# Patient Record
Sex: MALE | Race: WHITE
[De-identification: names, ages, dates, MRNs, and addresses within clinical notes are randomized per-mention and may not be internally consistent; named-entity substitution may affect disease eponyms.]

---

## 2019-09-04 ENCOUNTER — HOSPITAL ENCOUNTER (EMERGENCY)
Dept: HOSPITAL 11 - JP.ED | Age: 34
Discharge: HOME | End: 2019-09-04
Payer: MEDICAID

## 2019-09-04 DIAGNOSIS — F17.210: ICD-10-CM

## 2019-09-04 DIAGNOSIS — Z88.0: ICD-10-CM

## 2019-09-04 DIAGNOSIS — N39.0: Primary | ICD-10-CM

## 2019-09-04 DIAGNOSIS — Z79.899: ICD-10-CM

## 2019-09-04 NOTE — EDM.PDOC
ED HPI GENERAL MEDICAL PROBLEM





- General


Chief Complaint: Flank Pain


Stated Complaint: LOW BACK/FLANK PAIN


Time Seen by Provider: 09/04/19 19:55


Source of Information: Reports: Patient


History Limitations: Reports: No Limitations





- History of Present Illness


INITIAL COMMENTS - FREE TEXT/NARRATIVE: 





33-year-old male with left posterior flank pain intermittent for the past week. 

He's having trouble with certain range of motion, sitting, standing and is 

keeping him awake at night. Intermittent radiation around to the abdomen, no 

dysuria or urinary frequency. No history of renal stones. No fevers or chills, 

nausea or vomiting.


Onset: Sudden


Duration: Day(s): (Started fairly suddenly 7 days ago)


Location: Reports: Back (Left flank)


Worsens with: Reports: Movement (Seems to be worse with movements and certain 

positions)


Associated Symptoms: Reports: No Other Symptoms


  ** Left Flank


Pain Score (Numeric/FACES): 9





- Related Data


 Allergies











Allergy/AdvReac Type Severity Reaction Status Date / Time


 


Penicillins Allergy  Nausea Verified 09/04/19 19:50











Home Meds: 


 Home Meds





Allopurinol [Zyloprim] 200 mg PO DAILY 09/04/19 [History]











Past Medical History





- Past Health History


Medical/Surgical History: Denies Medical/Surgical History


Genitourinary History: Reports: Renal Calculus





- Past Surgical History


Head Surgeries/Procedures: Reports: None


Male  Surgical History: Reports: None


Dermatological Surgical History: Reports: None





Social & Family History





- Family History


Family Medical History: Noncontributory





- Tobacco Use


Smoking Status *Q: Current Every Day Smoker


Years of Tobacco use: 20


Packs/Tins Daily: 1


Second Hand Smoke Exposure: Yes





- Caffeine Use


Caffeine Use: Reports: Coffee, Soda





- Recreational Drug Use


Recreational Drug Use: No





ED ROS GENERAL





- Review of Systems


Review Of Systems: See Below


Constitutional: Denies: Fever, Chills


Respiratory: Denies: Shortness of Breath


Cardiovascular: Denies: Chest Pain


GI/Abdominal: Denies: Abdominal Pain, Nausea, Vomiting


: Reports: Flank Pain


Musculoskeletal: Reports: Back Pain


Skin: Reports: No Symptoms (Left-sided)





ED EXAM,LOWER BACK PAIN/INJURY





- Physical Exam


Exam: See Below


Exam Limited By: No Limitations


General Appearance: Alert, No Apparent Distress (Looks uncomfortable but not 

distressed)


Head: Atraumatic


Respiratory/Chest: No Respiratory Distress, Lungs Clear


Cardiovascular: Regular Rate, Rhythm


Back Exam: CVA Tenderness (L) (He does have some mild CVA tenderness on the 

left side, and paraspinal tenderness to palpation).  No: CVA Tenderness (R)


Neurological: Alert, No Motor/Sensory Deficits, Oriented x 3





Course





- Vital Signs


Last Recorded V/S: 


 Last Vital Signs











Temp  96.9 F   09/04/19 19:47


 


Pulse  81   09/04/19 19:47


 


Resp  16   09/04/19 19:47


 


BP  139/94 H  09/04/19 19:47


 


Pulse Ox  97   09/04/19 19:47














- Orders/Labs/Meds


Orders: 


 Active Orders 24 hr











 Category Date Time Status


 


 CULTURE URINE [RM] Stat Lab  09/04/19 20:50 Received











Labs: 


 Laboratory Tests











  09/04/19 Range/Units





  20:05 


 


Urine Color  Yellow  (YELLOW)  


 


Urine Appearance  Clear  (CLEAR)  


 


Urine pH  5.5  (5.0-8.0)  


 


Ur Specific Gravity  1.005 L  (1.008-1.030)  


 


Urine Protein  Negative  (NEGATIVE)  mg/dL


 


Urine Glucose (UA)  Negative  (NEGATIVE)  mg/dL


 


Urine Ketones  Negative  (NEGATIVE)  mg/dL


 


Urine Occult Blood  Trace-lysed H  (NEGATIVE)  


 


Urine Nitrite  Negative  (NEGATIVE)  


 


Urine Bilirubin  Negative  (NEGATIVE)  


 


Urine Urobilinogen  0.2  (0.2-1.0)  EU/dL


 


Ur Leukocyte Esterase  Small  (NEGATIVE)  


 


Urine RBC  0-5  (0-5)  


 


Urine WBC  10-20 H  (0-5)  


 


Ur Epithelial Cells  Few  


 


Amorphous Sediment  Not seen  


 


Urine Bacteria  Few  


 


Urine Mucus  Not seen  














- Re-Assessments/Exams


Free Text/Narrative Re-Assessment/Exam: 





09/04/19 20:10


A UA was obtained and a CT of the abdomen and pelvis without contrast will be 

ordered.


09/04/19 20:48


UA returned with some WBCs and bacteria. CT also confirmed some bladder 

thickening and slight stranding which could indicate inflammation. Also he only 

has a single pelvic kidney. A urine culture was obtained, and the findings 

discussed with the patient. He'll be started on Macrobid twice daily pending 

the culture, and given some Flexeril and a 10 hydrocodone for pain control. He 

may want to cut back on the ibuprofen since he has a single kidney. Recheck 

with a primary provider in the next 1-2 weeks for a regular physical and 

consider a nephrology consult.





Departure





- Departure


Time of Disposition: 21:01


Disposition: Home, Self-Care 01


Clinical Impression: 


 UTI, Urinary tract infectious disease, Left flank pain








- Discharge Information


Instructions:  Flank Pain, Adult, Easy-to-Read


Referrals: 


PCP,None [Primary Care Provider] - 


Forms:  ED Department Discharge


Care Plan Goals: 


Use muscle relaxers and stronger pain medications as directed if needed. Take 

antibiotic twice daily and we will contact you with culture results if changes 

need to be made. Drink lots of water and try to cut back on ibuprofen intake. 

Consider getting a primary provider for a regular physical and discuss being 

followed by nephrology due to your single kidney. Return sooner if worsening 

such as fever or increased pain.





- My Orders


Last 24 Hours: 


My Active Orders





09/04/19 20:50


CULTURE URINE [RM] Stat 














- Assessment/Plan


Last 24 Hours: 


My Active Orders





09/04/19 20:50


CULTURE URINE [RM] Stat

## 2019-09-04 NOTE — CRLCT
INDICATION:



Left-sided flank pain.



TECHNIQUE:



CT of the abdomen and pelvis performed without oral or IV contrast.



FINDINGS:



Single kidney within the midline and left pelvis is somewhat bulbous. Mild 

to moderate dilatation of the internal collecting system and ureter of this 

pelvic kidney. No renal or ureteral calculi. The urinary bladder is 

somewhat lobulated in its wall is mildly thickened and irregular. Minimal 

stranding about the pelvic kidney and urinary bladder which could be 

related to slight inflammation or edema but is of indeterminate age. 

Remainder negative.



IMPRESSION:



1. No acute disease in abdomen or pelvis.



2. Single pelvic kidney with mild dilatation of the ureter internal 

collecting system of this kidney. No renal ureteral calculi.



3. Mildly thickened irregular bladder wall.



4. Slight stranding about the urinary bladder and the pelvic kidney which 

could be related to slight inflammation or edema of an indeterminate 

etiology.



5. Small nodule left adrenal gland may be an adrenal adenoma.



Please note that all CT scans at this facility use dose modulation, 

iterative reconstruction, and/or weight-based dosing when appropriate to 

reduce radiation dose to as low as reasonably achievable.



Dictated by Silvano Ndiaye MD @ Sep  4 2019  8:38PM



Signed by Dr. Silvano Ndiaye @ Sep  4 2019  8:38PM

## 2021-10-06 ENCOUNTER — HOSPITAL ENCOUNTER (EMERGENCY)
Dept: HOSPITAL 11 - JP.ED | Age: 36
Discharge: HOME | End: 2021-10-06
Payer: MEDICAID

## 2021-10-06 DIAGNOSIS — I11.0: Primary | ICD-10-CM

## 2021-10-06 DIAGNOSIS — Z79.899: ICD-10-CM

## 2021-10-06 DIAGNOSIS — F17.200: ICD-10-CM

## 2021-10-06 DIAGNOSIS — Z88.0: ICD-10-CM

## 2021-10-06 DIAGNOSIS — R00.0: ICD-10-CM

## 2021-10-06 DIAGNOSIS — I50.21: ICD-10-CM

## 2021-10-06 DIAGNOSIS — Z20.822: ICD-10-CM

## 2021-10-06 PROCEDURE — 87635 SARS-COV-2 COVID-19 AMP PRB: CPT

## 2021-10-06 PROCEDURE — 83880 ASSAY OF NATRIURETIC PEPTIDE: CPT

## 2021-10-06 PROCEDURE — 93005 ELECTROCARDIOGRAM TRACING: CPT

## 2021-10-06 PROCEDURE — 84484 ASSAY OF TROPONIN QUANT: CPT

## 2021-10-06 PROCEDURE — 36415 COLL VENOUS BLD VENIPUNCTURE: CPT

## 2021-10-06 PROCEDURE — 80053 COMPREHEN METABOLIC PANEL: CPT

## 2021-10-06 PROCEDURE — 85025 COMPLETE CBC W/AUTO DIFF WBC: CPT

## 2021-10-06 PROCEDURE — 71045 X-RAY EXAM CHEST 1 VIEW: CPT

## 2021-10-06 PROCEDURE — U0002 COVID-19 LAB TEST NON-CDC: HCPCS

## 2021-10-06 PROCEDURE — 99285 EMERGENCY DEPT VISIT HI MDM: CPT

## 2021-10-06 NOTE — CRLCR
For Patients:  As a result of the 21st Century Cures Act, medical imaging 

exams and procedure reports are released immediately into your electronic 

medical record.  You may view this report before your referring provider.  

If you have questions, please contact your health care provider.



Indication:



Dyspnea 



Comparison:



None available.



Technique:



Single AP view chest



Findings:



There is hyperinflation and chronic interstitial change.



There are ground-glass and airspace opacities of the bilateral hemithoraces 

likely representing developing multifocal infiltrates.



The cardiac silhouette is markedly enlarged, a pericardial effusion cannot 

be excluded. There is questionable surgical device projecting over the 

median chest.



The bony thorax is grossly intact.



Impression:



Demonstration of marked cardiomegaly with moderate ground-glass and 

airspace opacities seen throughout the bilateral hemithoraces likely 

representing developing pulmonary edema and/or multifocal infiltrates. 

Correlate with history of corona virus infection if there remains 

persistent clinical concern.



Dictated by Dipak Amos MD @ 10/6/2021 2:06:32 PM



(Electronically Signed)

## 2021-10-06 NOTE — EDM.PDOC
ED HPI GENERAL MEDICAL PROBLEM





- General


Chief Complaint: Respiratory Problem


Stated Complaint: SOB AND CHEST PAIN


Time Seen by Provider: 10/06/21 14:50


Source of Information: Reports: Patient


History Limitations: Reports: No Limitations





- History of Present Illness


INITIAL COMMENTS - FREE TEXT/NARRATIVE: 


This is a 36-year-old male without known significant medical history who p

resents with concerns of cough and dyspnea.  He reports that he first noticed a 

cough several weeks ago.  Over the past week or so he has noted increasing 

dyspnea.  This now occurs with minimal exertion.  He reports that he is waking 

up from sleep at night short of breath.  Cough has been nonproductive.  He also 

notes some lower extremity edema.  He has no fevers or chills.  No history of 

cardiac disease.  No chest pain.  No active drug use, does report history of 

methamphetamine use from which he has been clean for approximately 3 years.





  ** Chest


Pain Score (Numeric/FACES): 10





- Related Data


                                    Allergies











Allergy/AdvReac Type Severity Reaction Status Date / Time


 


Penicillins Allergy  Nausea Verified 10/06/21 13:48











Home Meds: 


                                    Home Meds





allopurinoL [Zyloprim] 200 mg PO DAILY 09/04/19 [History]


Albuterol [Ventolin HFA] 2 puff IH QID 10/06/21 [History]


Doxycycline [Doxycycline Hyclate] 100 mg PO BID 10/06/21 [History]


Furosemide [Lasix] 40 mg PO DAILY #30 tab 10/06/21 [Rx]


Ibuprofen [Motrin] 800 mg PO BIDM PRN 10/06/21 [History]


carvediloL [Coreg] 3.125 mg PO DAILY #30 tablet 10/06/21 [Rx]


predniSONE [Prednisone] 20 mg PO DAILY 10/06/21 [History]











Past Medical History





- Past Health History


Medical/Surgical History: Denies Medical/Surgical History


HEENT History: Reports: None


Cardiovascular History: Reports: Hypertension


Other Cardiovascular History: chest pain


Respiratory History: Reports: None


Gastrointestinal History: Reports: None


Genitourinary History: Reports: Renal Calculus


Musculoskeletal History: Reports: None


Neurological History: Reports: Concussion


Psychiatric History: Reports: Anxiety


Endocrine/Metabolic History: Reports: None


Hematologic History: Reports: None


Immunologic History: Reports: None


Oncologic (Cancer) History: Reports: None


Dermatologic History: Reports: None





- Infectious Disease History


Infectious Disease History: Reports: Chicken Pox, Novel Coronavirus





- Past Surgical History


Head Surgeries/Procedures: Reports: None


HEENT Surgical History: Reports: None


Cardiovascular Surgical History: Reports: None


GI Surgical History: Reports: None


Male  Surgical History: Reports: None


Dermatological Surgical History: Reports: None





Social & Family History





- Family History


Family Medical History: No Pertinent Family History





- Tobacco Use


Tobacco Use Comment: current smoker for 20 years





- Caffeine Use


Caffeine Use: Reports: Coffee, Soda





- Recreational Drug Use


Recreational Drug Type: Reports: Marijuana/Hashish, Methamphetamine





ED ROS GENERAL





- Review of Systems


Review Of Systems: See Below


Constitutional: Reports: No Symptoms


HEENT: Reports: No Symptoms


Respiratory: Reports: Shortness of Breath


Cardiovascular: Reports: Edema, Orthopnea


Endocrine: Reports: No Symptoms


GI/Abdominal: Reports: No Symptoms


: Reports: No Symptoms


Musculoskeletal: Reports: No Symptoms


Skin: Reports: No Symptoms


Neurological: Reports: No Symptoms


Psychiatric: Reports: No Symptoms


Hematologic/Lymphatic: Reports: No Symptoms


Immunologic: Reports: No Symptoms





ED EXAM, GENERAL





- Physical Exam


Exam: See Below


Exam Limited By: No Limitations


General Appearance: Alert, No Apparent Distress


Nose: Normal Inspection


Throat/Mouth: Normal Inspection


Head: Atraumatic, Normocephalic


Neck: Normal Inspection


Respiratory/Chest: Rhonchi (Throughout the lung fields.  No tachypnea or 

respiratory distress.)


Cardiovascular: No Murmur, Tachycardia, Other (Moderate bilateral lower 

extremity edema to the level of the ankle)


GI/Abdominal: Soft, Non-Tender


 (Male) Exam: No Hernia


Back Exam: Normal Inspection


Extremities: Pedal Edema


Neurological: Alert, Oriented


Psychiatric: Normal Affect, Normal Mood


Skin Exam: Warm, Dry


  ** #1 Interpretation


Rhythm: Other (Sinus tachycardia.  Rate is 110.  Lateral T wave flattening, 

nonspecific change, no other ischemic changes.  No prior for comparison.)





Course





- Vital Signs


Last Recorded V/S: 


                                Last Vital Signs











Temp  35.9 C L  10/06/21 13:40


 


Pulse  114 H  10/06/21 16:07


 


Resp  22 H  10/06/21 16:07


 


BP  118/85   10/06/21 16:07


 


Pulse Ox  97   10/06/21 16:07














- Orders/Labs/Meds


Orders: 


                               Active Orders 24 hr











 Category Date Time Status


 


 EKG 12 Lead [EK] Routine Ther  10/06/21 15:56 Ordered











Labs: 


                                Laboratory Tests











  10/06/21 10/06/21 10/06/21 Range/Units





  13:36 14:26 14:26 


 


WBC   15.8 H   (4.5-11.0)  K/uL


 


RBC   5.26   (4.30-5.90)  M/uL


 


Hgb   16.4 H   (12.0-15.0)  g/dL


 


Hct   47.8   (40.0-54.0)  %


 


MCV   91   (80-98)  fL


 


MCH   31   (27-31)  pg


 


MCHC   34   (32-36)  %


 


Plt Count   242   (150-400)  K/uL


 


Neut % (Auto)   84.3 H   (36-66)  %


 


Lymph % (Auto)   8.4 L   (24-44)  %


 


Mono % (Auto)   7.1 H   (2-6)  %


 


Eos % (Auto)   0.1 L   (2-4)  %


 


Baso % (Auto)   0.1   (0-1)  %


 


Sodium    132 L  (140-148)  mmol/L


 


Potassium    5.5 H  (3.6-5.2)  mmol/L


 


Chloride    99 L  (100-108)  mmol/L


 


Carbon Dioxide    21  (21-32)  mmol/L


 


Anion Gap    17.5 H  (5.0-14.0)  mmol/L


 


BUN    45 H  (7-18)  mg/dL


 


Creatinine    2.1 H  (0.8-1.3)  mg/dL


 


Est Cr Clr Drug Dosing    50.21  mL/min


 


Estimated GFR (MDRD)    36 L  (>60)  


 


Glucose    107 H  ()  mg/dL


 


Calcium    9.0  (8.5-10.1)  mg/dL


 


Total Bilirubin    1.3 H  (0.2-1.0)  mg/dL


 


AST    282 H  (15-37)  U/L


 


ALT    424 H  (12-78)  U/L


 


Alkaline Phosphatase    100  ()  U/L


 


Troponin I    0.036  (0.000-0.056)  ng/mL


 


NT-Pro-B Natriuret Pep    9013 H  (5-125)  pg/mL


 


Total Protein    5.9 L  (6.4-8.2)  g/dL


 


Albumin    3.2 L  (3.4-5.0)  g/dL


 


Globulin    2.7  (2.3-3.5)  g/dL


 


Albumin/Globulin Ratio    1.2  (1.2-2.2)  


 


SARS CoV-2 RNA Rapid BHARAT  Negative    











Meds: 


Medications














Discontinued Medications














Generic Name Dose Route Start Last Admin





  Trade Name Dariela  PRN Reason Stop Dose Admin


 


Carvedilol  3.125 mg  10/06/21 15:31  10/06/21 16:03





  Carvedilol 3.125 Mg Tab  PO  10/06/21 15:32  3.125 mg





  ONETIME ONE   Administration


 


Furosemide  40 mg  10/06/21 15:30  10/06/21 16:03





  Furosemide 40 Mg Tab  PO  10/06/21 15:31  40 mg





  ONETIME ONE   Administration














- Re-Assessments/Exams


Free Text/Narrative Re-Assessment/Exam: 


This is a 36-year-old male who presents with concerns of cough and dyspnea.


On exam he is noted to be tachycardic.  Blood pressure is preserved.  

Appropriate saturation on room air.  Cardiopulmonary exam remarkable for 

crackles throughout the lung fields, but appears comfortable with his breathing.

  He is also noted to have lower extremity edema.





Initially evaluated with chest x-ray which shows diffuse infiltrates and 

cardiomegaly.  Covid negative.  After obtaining more history/exam became 

apparent patient is having symptoms consistent with newly diagnosed CHF.





Point-of-care echocardiogram was performed.  This shows significantly reduced 

left ventricular ejection fraction.  No right ventricular enlargement.  No 

pericardial effusion.  Diffuse pulmonary B-lines indicative of pulmonary edema w

ithout pleural effusion.





Labs obtained, multiple marked abnormalities.  His creatinine is 2.  K mildly 

elevated at 5.5.  LFTs are elevated.  BNP is 9000.  Troponin is within normal 

limits.  Kidney injury and hepatopathy are consistent with acute CHF which we 

have seen clinically with him.





We ambulated him around the ED.  His saturations remained in the high 90s and he

 had no significant increased work of breathing.  We have no hospital beds 

available in our region and the patient is motivated to continue his treatment 

work-up as an outpatient, we are therefore cautiously going to proceed with this

 strategy.  We are starting him on daily Lasix, 40 mg, as well as a low-dose of 

carvedilol given his apparent systolic CHF.  He is going to hold his outpatient 

prescription for ibuprofen.  I have ordered him to have repeat labs and visit 

with his primary doctor in 2 days so he can be reassessed.  If he is not having 

good output on the Lasix may want to consider an increased dose.  Additionally, 

I have ordered him an outpatient echocardiogram and prompt follow-up with the 

visiting [facility] cardiologist.





Etiology of his heart failure is not clear.  He denies viral prodrome.  No isc

hemic symptoms with negative troponin.  Does have a distant hx of 

methamphetamine abuse so suspect for this.





Had a conversation with the patient that his heart failure is nearly 

necessitating hospital admission.  He knows to be in the look out for worsening 

symptoms and will return to the emergency room for reevaluation of these occur. 

 He is seen today in the presence of his significant other and they both agree 

that they feel safe with outpatient management and will return if needed.








10/06/21 15:59








Departure





- Departure


Time of Disposition: 15:35


Disposition: Home, Self-Care 01


Clinical Impression: 


Acute congestive heart failure


Qualifiers:


 Heart failure type: systolic Qualified Code(s): I50.21 - Acute systolic 

(congestive) heart failure








- Discharge Information


*PRESCRIPTION DRUG MONITORING PROGRAM REVIEWED*: No


*COPY OF PRESCRIPTION DRUG MONITORING REPORT IN PATIENT [place]: No


Prescriptions: 


carvediloL [Coreg] 3.125 mg PO DAILY #30 tablet


Furosemide [Lasix] 40 mg PO DAILY #30 tab


Instructions:  Shortness of Breath, Adult, Easy-to-Read, Heart Failure, Self 

Care, Easy-to-Read, Supporting Someone With Heart Failure, Heart Failure, 

Diagnosis, Easy-to-Read, Preventing Heart Failure, Heart Failure Exacerbation


Referrals: 


Julien Arthur NP [Primary Care Provider] - 


Forms:  ED Department Discharge


Additional Instructions: 


As discussed, we believe that you are having trouble with your heart, called 

CHF, that is causing your symptoms.


You are very near needing hospitalization.  For this reason, if you feel that 

your symptoms worsen at all please come back to the emergency room so we can 

reevaluate you.





We have several medication changes after today's visit:


1) Start taking the prescribed furosemide, this is a pill that will help 

eliminate the water from your lungs.


2) We are starting you on additional medication called Coreg (carvedilol) which 

helps control your blood pressure and helps your heart function.


3) STOP TAKING ANY IBUPROFEN, IT IS CAUSING TROUBLE WITH YOUR KIDNEYS





We have placed orders for you to have repeat labs and a follow-up visit with 

Julien Arthur this week.


We have also placed a referral for you to be seen by a cardiologist when they 

come to [place].





Thank you for trusting us to care for you today. 





Sepsis Event Note (ED)





- Focused Exam


Vital Signs: 


                                   Vital Signs











  Temp Pulse Pulse Resp BP BP Pulse Ox


 


 10/06/21 16:07    114 H  22 H   118/85  97


 


 10/06/21 16:03   114 H    118/85  


 


 10/06/21 13:40  35.9 C L   113 H  20   128/95 H  97


 


 10/06/21 12:40  35.9 C L   113 H  20   128/95 H  97














- My Orders


Last 24 Hours: 


My Active Orders





10/06/21 15:56


EKG 12 Lead [EK] Routine 














- Assessment/Plan


Last 24 Hours: 


My Active Orders





10/06/21 15:56


EKG 12 Lead [EK] Routine